# Patient Record
Sex: MALE | Race: WHITE | NOT HISPANIC OR LATINO | URBAN - METROPOLITAN AREA
[De-identification: names, ages, dates, MRNs, and addresses within clinical notes are randomized per-mention and may not be internally consistent; named-entity substitution may affect disease eponyms.]

---

## 2020-10-21 ENCOUNTER — OFFICE VISIT (OUTPATIENT)
Dept: URGENT CARE | Facility: CLINIC | Age: 50
End: 2020-10-21
Payer: COMMERCIAL

## 2020-10-21 VITALS — HEART RATE: 76 BPM | RESPIRATION RATE: 18 BRPM | OXYGEN SATURATION: 99 % | TEMPERATURE: 96.9 F

## 2020-10-21 DIAGNOSIS — Z20.822 EXPOSURE TO COVID-19 VIRUS: Primary | ICD-10-CM

## 2020-10-21 PROCEDURE — U0003 INFECTIOUS AGENT DETECTION BY NUCLEIC ACID (DNA OR RNA); SEVERE ACUTE RESPIRATORY SYNDROME CORONAVIRUS 2 (SARS-COV-2) (CORONAVIRUS DISEASE [COVID-19]), AMPLIFIED PROBE TECHNIQUE, MAKING USE OF HIGH THROUGHPUT TECHNOLOGIES AS DESCRIBED BY CMS-2020-01-R: HCPCS | Performed by: PHYSICIAN ASSISTANT

## 2020-10-21 PROCEDURE — 99202 OFFICE O/P NEW SF 15 MIN: CPT | Performed by: FAMILY MEDICINE

## 2020-10-22 LAB — SARS-COV-2 RNA SPEC QL NAA+PROBE: NOT DETECTED

## 2022-03-01 ENCOUNTER — OFFICE VISIT (OUTPATIENT)
Dept: URGENT CARE | Facility: CLINIC | Age: 52
End: 2022-03-01
Payer: COMMERCIAL

## 2022-03-01 VITALS
BODY MASS INDEX: 27.98 KG/M2 | WEIGHT: 225 LBS | RESPIRATION RATE: 18 BRPM | TEMPERATURE: 97.4 F | SYSTOLIC BLOOD PRESSURE: 146 MMHG | DIASTOLIC BLOOD PRESSURE: 74 MMHG | OXYGEN SATURATION: 98 % | HEART RATE: 82 BPM | HEIGHT: 75 IN

## 2022-03-01 DIAGNOSIS — G43.819 OTHER MIGRAINE WITHOUT STATUS MIGRAINOSUS, INTRACTABLE: Primary | ICD-10-CM

## 2022-03-01 PROCEDURE — 99213 OFFICE O/P EST LOW 20 MIN: CPT | Performed by: PHYSICIAN ASSISTANT

## 2022-03-01 RX ORDER — KETOROLAC TROMETHAMINE 30 MG/ML
30 INJECTION, SOLUTION INTRAMUSCULAR; INTRAVENOUS ONCE
Status: COMPLETED | OUTPATIENT
Start: 2022-03-01 | End: 2022-03-01

## 2022-03-01 RX ORDER — ACETAMINOPHEN, ASPIRIN AND CAFFEINE 250; 250; 65 MG/1; MG/1; MG/1
1 TABLET, FILM COATED ORAL EVERY 6 HOURS PRN
COMMUNITY

## 2022-03-01 RX ORDER — ONDANSETRON 4 MG/1
4 TABLET, ORALLY DISINTEGRATING ORAL EVERY 6 HOURS PRN
Qty: 20 TABLET | Refills: 0 | Status: SHIPPED | OUTPATIENT
Start: 2022-03-01

## 2022-03-01 RX ADMIN — KETOROLAC TROMETHAMINE 30 MG: 30 INJECTION, SOLUTION INTRAMUSCULAR; INTRAVENOUS at 09:01

## 2022-03-01 NOTE — PROGRESS NOTES
[id number]    NAME: Emeli Guerrier is a 46 y o  male  : 1970    MRN: [id number]  DATE: 2022  TIME: 9:05 AM    Assessment and Plan   Other migraine without status migrainosus, intractable [G43 819]  1  Other migraine without status migrainosus, intractable  ketorolac (TORADOL) injection 30 mg    ondansetron (ZOFRAN-ODT) 4 mg disintegrating tablet     Nurse administered Toradol injection  Patient Instructions   Patient Instructions   Call your primary care provider as soon as possible to arrange follow-up for the next 2-3 days  If significant worsening of your headache proceed to the [facility] emergency room for further evaluation  Anti nausea tablet prescription sent to pharmacy  Work note given  Chief Complaint     Chief Complaint   Patient presents with    Headache     Headache for the past 2 5 days  Taking OTC Excedrin Migrane with no relief  Has N/V  Has not had a HA like this in 1 year       History of Present Illness   Emeli Guerrier presents to the clinic c/o  59-year-old male with history migraine headaches comes in with 2 and half day migraine that has not been improving  Headache waxes and wanes  Associated photophobia, fatigue and sometimes some numbness the top of his head  He has been taking Excedrin migraine without much relief  He did vomit on  and Monday  Nausea seems to be worse when headache increases  Could not get with his primary care provider through [facility]  History of bad concussion several years ago  Did get therapy for concussion  Also states that he has a history of a blocked 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  He reports that he was in the emergency room May 2021 with what was thought to be a TIA  He says that this is more like his normal headache and not like that episode  Review of Systems   Review of Systems   Constitutional: Positive for activity change, appetite change and fatigue   Negative for chills, diaphoresis and fever  HENT: Negative  Eyes: Positive for photophobia  Negative for visual disturbance  Respiratory: Negative  Cardiovascular: Negative  Gastrointestinal: Positive for nausea and vomiting  Neurological: Positive for dizziness and headaches  Current Medications     Long-Term Medications   Medication Sig Dispense Refill    ondansetron (ZOFRAN-ODT) 4 mg disintegrating tablet Take 1 tablet (4 mg total) by mouth every 6 (six) hours as needed for nausea or vomiting 20 tablet 0       Current Allergies     Allergies as of 03/01/2022    (No Known Allergies)          The following portions of the patient's history were reviewed and updated as appropriate: allergies, current medications, past family history, past medical history, past social history, past surgical history and problem list   History reviewed  No pertinent past medical history  History reviewed  No pertinent surgical history  History reviewed  No pertinent family history  Objective   /74   Pulse 82   Temp (!) 97 4 °F (36 3 °C) (Tympanic)   Resp 18   Ht 6' 3" (1 905 m)   Wt 102 kg (225 lb)   SpO2 98%   BMI 28 12 kg/m²   No LMP for male patient  Physical Exam     Physical Exam  Vitals and nursing note reviewed  Constitutional:       General: He is not in acute distress  Appearance: He is normal weight  He is not ill-appearing, toxic-appearing or diaphoretic  Comments: Sitting in a darkened room with dark sunglasses on  HENT:      Head: Normocephalic and atraumatic  Mouth/Throat:      Mouth: Mucous membranes are moist       Pharynx: No oropharyngeal exudate or posterior oropharyngeal erythema  Eyes:      Pupils: Pupils are equal, round, and reactive to light  Neck:      Vascular: No carotid bruit  Cardiovascular:      Rate and Rhythm: Normal rate and regular rhythm  Heart sounds: Normal heart sounds  No murmur heard  No friction rub  No gallop      Pulmonary: Effort: Pulmonary effort is normal  No respiratory distress  Breath sounds: Normal breath sounds  No stridor  No wheezing, rhonchi or rales  Musculoskeletal:      Cervical back: Normal range of motion and neck supple  No rigidity or tenderness  Lymphadenopathy:      Cervical: No cervical adenopathy  Skin:     General: Skin is warm and dry  Coloration: Skin is not pale  Neurological:      General: No focal deficit present  Mental Status: He is alert and oriented to person, place, and time  Cranial Nerves: No cranial nerve deficit  Sensory: No sensory deficit  Motor: No weakness        Coordination: Coordination normal       Gait: Gait normal       Deep Tendon Reflexes: Reflexes normal    Psychiatric:         Mood and Affect: Mood normal          Behavior: Behavior normal

## 2022-03-01 NOTE — LETTER
March 1, 2022     Patient: Good Forbes   YOB: 1970   Date of Visit: 3/1/2022       To Whom It May Concern:    Patient seen in office today for acute medical ailment  He reports that he missed work yesterday and today due to ailment  May attempt return to work on Wednesday or Thursday as tolerated  Follow-up should be with primary care provider           Sincerely,        Janell Rojo PA-C    CC: No Recipients

## 2022-03-01 NOTE — PATIENT INSTRUCTIONS
Call your primary care provider as soon as possible to arrange follow-up for the next 2-3 days  If significant worsening of your headache proceed to the [facility] emergency room for further evaluation  Anti nausea tablet prescription sent to pharmacy  Work note given